# Patient Record
Sex: MALE | Race: WHITE | NOT HISPANIC OR LATINO | Employment: OTHER
[De-identification: names, ages, dates, MRNs, and addresses within clinical notes are randomized per-mention and may not be internally consistent; named-entity substitution may affect disease eponyms.]

---

## 2024-08-20 ENCOUNTER — TRANSCRIBE ORDERS (OUTPATIENT)
Dept: OTHER | Age: 74
End: 2024-08-20

## 2024-08-20 DIAGNOSIS — M54.2 NECK PAIN: Primary | ICD-10-CM

## 2024-08-22 ENCOUNTER — THERAPY VISIT (OUTPATIENT)
Dept: PHYSICAL THERAPY | Facility: CLINIC | Age: 74
End: 2024-08-22
Attending: FAMILY MEDICINE
Payer: MEDICARE

## 2024-08-22 DIAGNOSIS — M54.2 NECK PAIN: Primary | ICD-10-CM

## 2024-08-22 PROCEDURE — 97161 PT EVAL LOW COMPLEX 20 MIN: CPT | Mod: GP

## 2024-08-22 PROCEDURE — 97110 THERAPEUTIC EXERCISES: CPT | Mod: GP

## 2024-08-22 NOTE — PROGRESS NOTES
PHYSICAL THERAPY EVALUATION  Type of Visit: Evaluation       Fall Risk Screen:  Fall screen completed by: PT  Have you fallen 2 or more times in the past year?: No  Have you fallen and had an injury in the past year?: No  Is patient a fall risk?: No    Subjective       Presenting condition or subjective complaint: Stiff neck from top of sholder to back of head mainly in the morning. I can usually work it out to a reasonable level.  Date of onset: 07/18/24    Relevant medical history:     Dates & types of surgery: Right wrist fusion. Left wrist carpal tunnel. Hernia surgery. Left knee total replacement.    Prior diagnostic imaging/testing results:       Prior therapy history for the same diagnosis, illness or injury: No      Prior Level of Function  Transfers:   Ambulation:   ADL:   IADL:     Living Environment  Social support: Alone   Type of home: House   Stairs to enter the home: Yes 5 Is there a railing: Yes     Ramp: Yes   Stairs inside the home: Yes 13 Is there a railing: Yes     Help at home: None  Equipment owned:       Employment: No    Hobbies/Interests: Fishing, hunting. Wood working. Cars.    Patient goals for therapy: Get rid of the stiffness      SUBJECTIVE:  Jared is a 74 year old male who reports that in the morning he gets a lot of stiffness in the neck in the mornings.  He has difficulty looking up into extension, rotating to the right and really most motions.  He has pain and tightness in the right upper trap extending from the upper trap to the back of the neck. He can hear clicks / cavitations when he turns his head when it's tight. He has been dealing with this for 5 weeks now and has no previous history of neck pain. He has bought a neck pillow to sleep on, which has helped him.    Patient reports symptoms of:  Pain, Range of Motion Loss, and Stiffness / Tightness    Patient report of Pain:  Pain Rating Now: 0/10  Pain Rating at Best: 0/10  Pain Rating at Worst: 5/10  Pain Location: Right Upper  Trapezius  Pain Quality/Description: Tightness and Sore  Pain Better with: Massaging it and giving it time  Pain Worse with: Waking up in the AM  Progression of Symptoms: Unchanged    Patient reports Red Flags symptoms of:  None    OBJECTIVE:  Cervical AROM at the Moment (In the mornings when he has symptoms, he states he loses extension and right rotation)  Cervical Flexion: No Restriction and WNL  Cervical Extension: No Restriction and WNL  Cervical Right Side Bend: No Restriction and WNL  Cervical Left Side Bend: No Restriction and WNL  Cervical Right Rotation: No Restriction and WNL  Cervical Left Rotation: No Restriction and WNL    Upper Extremity Manual Muscle Test / Myotome Assessment:  Shoulder Flexion (C5): Right Shoulder 4+/5, Left Shoulder 5/5  Shoulder Abduction (C5): Right Shoulder 4/5, Left Shoulder 5/5  Shoulder External Rotation (C5): Right Shoulder 4/5, Left Shoulder 5/5  Elbow Flexion (C6): Right Elbow 5/5, Left Elbow 5/5  Elbow Extension (C7): Right Elbow 5/5, Left Elbow 5/5  Wrist Extension (C6): Right Wrist 5/5, Left Wrist 5/5  Thumb Extension (C8): Right Thumb 5/5, Left Thumb 5/5  Finger Abduction / Adduction (T1): Right Hand 5/5, Left Hand 5/5    Upper Extremity Neural Tension Assessment  Right Median Nerve: Negative  Left Median Nerve: Negative      Cervical Special Tests  Right Side Spurlings Test: Negative  Left Side Spurlings Test: Negative    Moent MDT Repeated Movements Cervical Assessment:  Repeated Cervical Flexion: No Effect  Repeated Cervical Extension: No Effect  Repeated Cervical Protraction: No Effect  Repeated Cervical Retraction: No Effect  Repeated Cervical Retraction with Overpressure: No Effect      Monet VILLANUEVAT Classification: Other / Inconclusive: No Directional Preference.  Suspect Muscular Origin       ASSESSMENT:  Jared is a 74 year old male referred to Physical Therapy for Neck Pain   from Vlad Wang.  Jared demonstrates findings of Pain and Motion Loss that  justify a need for formal Physical Therapy. These impairments interfere with their ability to perform self care tasks, work tasks, recreational activities, household chores, driving , household mobility, and community mobility as compared to their previous level of function.    Medical Diagnosis: Neck Pain    Treatment Diagnosis: Neck Pain     Clinical Decision Making (Complexity):  Clinical Presentation: Stable/Uncomplicated  Clinical Presentation Rationale: based on medical and personal factors listed in PT evaluation  Clinical Decision Making (Complexity): Low complexity      PHYSICAL THERAPY PLAN OF CARE:  Treatment Interventions:  Interventions: Manual Therapy, Neuromuscular Re-education, Therapeutic Activity, Therapeutic Exercise    Long Term Goals     PT Goal 1  Goal Identifier: AM Stiffness  Goal Description: Patient will have full and painfree cervical ROM when he wakes up in the morning with 1/10 pain or less for improved ability to dress and perform personal hygiene  Rationale: to maximize safety and independence with performance of ADLs and functional tasks;to maximize safety and independence within the home;to maximize safety and independence within the community;to maximize safety and independence with transportation;to maximize safety and independence with self cares  Goal Progress: Has neck tightness that limits his cervical ROM when he wakes up  Target Date: 10/22/24    Frequency of Treatment: 1x a month  Duration of Treatment: 2 months         Risks and benefits of evaluation/treatment have been explained.   Patient/Family/caregiver agrees with Plan of Care.      Evaluation Time:     PT Eval, Low Complexity Minutes (23762): 15         Signing Clinician: Beni Estrada, LUCY        SUMMARY OF PLAN OF CARE:  Jared presents with Neck Stiffness only occurring in the AM.  He demonstrates no radicular symptoms and negative spurlings, which leaves us to suspect muscular origin such as upper trap tightness.   Targeted stretching exercises were provided to him and no further therapy was scheduled.  If he doesn't have success with these, we welcome him to return to therapy for new exercises targeting joint mobility and/or strengthening          [facility]                                                                                   OUTPATIENT PHYSICAL THERAPY      PLAN OF TREATMENT FOR OUTPATIENT REHABILITATION   Patient's Last Name, First Name, Jared Nelson YOB: 1950   Provider's Name   [facility]   Medical Record No.  [id number]     Onset Date: 07/18/24  Start of Care Date: 08/22/24     Medical Diagnosis:  Neck Pain      PT Treatment Diagnosis:  Neck Pain Plan of Treatment  Frequency/Duration: 1x a month/ 2 months    Certification date from 08/22/24 to 10/22/24         See note for plan of treatment details and functional goals     Beni Estrada, PT                         I CERTIFY THE NEED FOR THESE SERVICES FURNISHED UNDER        THIS PLAN OF TREATMENT AND WHILE UNDER MY CARE .             Physician Signature               Date    X_____________________________________________________                  Referring Provider:  Vlad Wang MD    Initial Assessment  See Epic Evaluation- Start of Care Date: 08/22/24